# Patient Record
Sex: MALE | URBAN - METROPOLITAN AREA
[De-identification: names, ages, dates, MRNs, and addresses within clinical notes are randomized per-mention and may not be internally consistent; named-entity substitution may affect disease eponyms.]

---

## 2021-12-15 ENCOUNTER — HOSPITAL ENCOUNTER (OUTPATIENT)
Dept: LAB | Age: 76
Discharge: HOME | End: 2021-12-15

## 2021-12-15 PROCEDURE — 88305 TISSUE EXAM BY PATHOLOGIST: CPT

## 2022-07-19 ENCOUNTER — OFFICE VISIT (OUTPATIENT)
Dept: NEUROLOGY | Age: 77
End: 2022-07-19
Payer: MEDICARE

## 2022-07-19 VITALS
BODY MASS INDEX: 29.4 KG/M2 | DIASTOLIC BLOOD PRESSURE: 83 MMHG | HEART RATE: 88 BPM | WEIGHT: 194 LBS | SYSTOLIC BLOOD PRESSURE: 145 MMHG | HEIGHT: 68 IN

## 2022-07-19 DIAGNOSIS — I67.9 CEREBROVASCULAR SMALL VESSEL DISEASE: ICD-10-CM

## 2022-07-19 DIAGNOSIS — G62.9 PERIPHERAL POLYNEUROPATHY: Primary | ICD-10-CM

## 2022-07-19 DIAGNOSIS — R26.89 ABNORMALITY OF GAIT DUE TO IMPAIRMENT OF BALANCE: ICD-10-CM

## 2022-07-19 DIAGNOSIS — G25.0 ESSENTIAL TREMOR: ICD-10-CM

## 2022-07-19 PROCEDURE — 1123F ACP DISCUSS/DSCN MKR DOCD: CPT | Performed by: PSYCHIATRY & NEUROLOGY

## 2022-07-19 PROCEDURE — 99205 OFFICE O/P NEW HI 60 MIN: CPT | Performed by: PSYCHIATRY & NEUROLOGY

## 2022-07-19 RX ORDER — GABAPENTIN 300 MG/1
300 CAPSULE ORAL NIGHTLY
COMMUNITY
Start: 2022-03-30

## 2022-07-19 RX ORDER — DICYCLOMINE HYDROCHLORIDE 10 MG/1
CAPSULE ORAL
COMMUNITY
Start: 2022-06-25

## 2022-07-19 RX ORDER — LITHIUM CARBONATE 300 MG/1
300 CAPSULE ORAL DAILY
COMMUNITY
Start: 2022-04-20

## 2022-07-19 RX ORDER — FLUTICASONE PROPIONATE 50 MCG
2 SPRAY, SUSPENSION (ML) NASAL DAILY
COMMUNITY

## 2022-07-19 RX ORDER — ASPIRIN 81 MG/1
81 TABLET ORAL DAILY
COMMUNITY

## 2022-07-19 RX ORDER — ASPIRIN 81 MG
1 TABLET, DELAYED RELEASE (ENTERIC COATED) ORAL DAILY
COMMUNITY

## 2022-07-19 RX ORDER — OMEPRAZOLE 40 MG/1
40 CAPSULE, DELAYED RELEASE ORAL
COMMUNITY
Start: 2022-04-20

## 2022-07-19 RX ORDER — FAMOTIDINE 20 MG/1
TABLET, FILM COATED ORAL DAILY
COMMUNITY
Start: 2022-06-21

## 2022-07-19 RX ORDER — ROSUVASTATIN CALCIUM 40 MG/1
40 TABLET, COATED ORAL DAILY
COMMUNITY
Start: 2022-04-20

## 2022-07-19 RX ORDER — ASCORBIC ACID 500 MG
500 TABLET ORAL DAILY
COMMUNITY

## 2022-07-19 NOTE — PROGRESS NOTES
[place] [address], [address] [facility]  Phone: [phone] Fax [phone]  Chauncey Rico MD      Patient: Johana Calvo  Provider: Chauncey Rico MD    CC:   Chief Complaint   Patient presents with    New Patient     Discuss neuropathy      Referring Provider:    History of Present Illness:     Johana Calvo is a 69 y.o. RH male who presents for further evaluation of essential tremor and peripheral neuropathy. He is unaccompanied for today's visit. This gentleman presents for second opinion regarding his peripheral neuropathy which was diagnosed on nerve conduction studies by his previous neurologist in [facility]. It appears that serum lab work-up has already been done as well and has been unremarkable. He does have some numbness in his feet but denies any neuropathic pain. He was started on gabapentin 300 mg at night and this may help him sleep but otherwise no other obvious benefits. He denies any adverse effects. He also has a mild tremor of his hands which is more noticeable with posture and with action and clinically is most consistent with essential tremor. However it should be noted that he continues to take relatively low dose of lithium chronically for bipolar disorder. Serum lithium levels have been within normal limits. Overall his tremor remains relatively mild and is not causing significant disruption in day-to-day activities but can affect his handwriting and dexterity at times. He denies any tremor of the head or voice. There has been no clinical concern for parkinsonism and a previous DaTscan was normal.    He was prescribed primidone for the tremor but has not taken it due to being fearful of any adverse effects or interactions. He does have some mild issues with his balance but overall continues to walk unassisted and denies any recent falls. He does have stumbles occasionally and feels unsteady on his feet.     MRI of the brain has been notable for chronic lacunar infarct in the right putamen. Review of Systems:   Review of Systems   Constitutional:  Negative for fatigue. HENT:  Negative for congestion. Eyes:  Negative for visual disturbance. Respiratory:  Negative for cough. Cardiovascular:  Negative for chest pain. Gastrointestinal:  Negative for constipation. Genitourinary:  Negative for dysuria. Musculoskeletal:  Positive for gait problem. Skin:  Negative for rash. Allergic/Immunologic: Negative for immunocompromised state. Neurological:  Positive for tremors and numbness. Psychiatric/Behavioral:  Negative for confusion. Lab/Imaging Review:   I REVIEWED PERTINENT LABS, IMAGES, AND REPORTS WITH THE PATIENT PERSONALLY, DIRECTLY AND FULLY. THE MOST PERTINENT FINDINGS ARE NOTED BELOW:    MRI Brain February 2022:  Chronic lacunar infarct within the periventricular white matter adjacent to the right lateral ventricular body. There are associated microangiopathic changes within the periventricular and deep cortical white matter. Otherwise no other acute intracranial abnormality. DaTscan February 2022:  No evidence of striatal dopaminergic deficiency. Carotid ultrasounds February 2022:  Right internal carotid artery has less than 50% stenosis. Mild plaque is noted. Left internal carotid artery has less than 50% stenosis. Mild plaque is noted. Bilateral vertebral arteries have antegrade flow. Past Medical History:     Past medical history, surgical history, social history, family history, medications, and allergies were reviewed and updated as appropriate.      PAST MEDICAL HISTORY:  Past Medical History:   Diagnosis Date    Bipolar disorder ([facility] [address]     History of prostate cancer     Hyperlipidemia     Peripheral neuropathy     Tremor      PAST SURGICAL HISTORY:   Past Surgical History:   Procedure Laterality Date    APPENDECTOMY      CHOLECYSTECTOMY      PARATHYROIDECTOMY      TONSILLECTOMY FAMILY HISTORY:  Family History   Problem Relation Age of Onset    Dementia Mother     Depression Father       SOCIAL HISTORY:  Social History     Socioeconomic History    Marital status: Single     Spouse name: None    Number of children: None    Years of education: None    Highest education level: None   Tobacco Use    Smoking status: Every Day     Types: Cigarettes    Smokeless tobacco: Never       Medications/Allergies:     MEDICATIONS:   Outpatient Encounter Medications as of 7/19/2022   Medication Sig Dispense Refill    lithium 300 MG capsule Take 300 mg by mouth in the morning. omeprazole (PRILOSEC) 40 MG delayed release capsule Take 40 mg by mouth every morning (before breakfast)      fluticasone (FLONASE) 50 MCG/ACT nasal spray 2 sprays by Nasal route daily      dicyclomine (BENTYL) 10 MG capsule TAKE 1 CAPSULE BY MOUTH FOUR TIMES DAILY AS NEEDED      cyanocobalamin 1000 MCG tablet Take 2,000 mcg by mouth in the morning. Multiple Vitamins-Minerals (MULTIVITAMIN-MINERALS) TABS tablet Take 1 tablet by mouth in the morning. zinc 50 MG CAPS Take 1 tablet by mouth daily      ascorbic acid (VITAMIN C) 500 MG tablet Take 500 mg by mouth in the morning. rosuvastatin (CRESTOR) 40 MG tablet Take 40 mg by mouth in the morning. famotidine (PEPCID) 20 MG tablet daily      aspirin 81 MG EC tablet Take 81 mg by mouth in the morning. bimatoprost (LUMIGAN) 0.01 % SOLN ophthalmic drops 1 drop nightly      vitamin D (CHOLECALCIFEROL) 25 MCG (1000 UT) TABS tablet Take 1,000 Units by mouth every evening      gabapentin (NEURONTIN) 300 MG capsule Take 300 mg by mouth nightly. Ibuprofen-diphenhydrAMINE HCl 200-25 MG CAPS Take 1 capsule by mouth nightly       No facility-administered encounter medications on file as of 7/19/2022.      ALLERGIES:  Allergies   Allergen Reactions    Penicillins Nausea And Vomiting and Other (See Comments)       Physical Exam:     BP (!) 145/83   Pulse 88   Ht 5' 8\" (1.727 m)   Wt 194 lb (88 kg)   BMI 29.50 kg/m²     General Exam:  General: Well developed, well nourished, in no apparent distress. HEENT: Normocephalic, atraumatic. Sclera anicteric. Oropharynx clear. Neck: Supple without masses  Cardiovascular: Regular rate and rhythm. No carotid bruits. Lungs: Non-labored breathing. Abdomen: Soft, nontender, nondistended. Extremities: Peripheral pulses intact. No edema and no rashes. Neurological Exam:     MS/Language/Speech:  Alert. Oriented x 3. Language fluent. Speech normal.  No vocal tremor. Cranial Nerves: PERRL. Eye movements full with normal pursuits. No nystagmus. Face was symmetric with good activation and normal sensation. Tongue and palate were midline. Shoulder shrug symmetric. Motor: Strength was full in all proximal and distal muscle groups. Tone was normal.     Abnormal Movements: There is a mild postural and action tremor of the hands. No tremor at rest.  No head tremor. No bradykinesia. Sensory: Slight impairment to vibration in the distal lower extremities bilaterally. Cerebellar: No ataxia or dysmetria with finger-nose-finger bilaterally. Reflexes (R/L): Biceps (2+/2+), Brachioradialis (2+/2+), Patellar (2+/2+), Ankle (0+/0+). Wilson's was negative and plantar responses were flexor. Gait: He can rise from a seated position without difficulty. Posture is upright. Romberg is negative. He walks with a mildly unsteady gait and has difficulty with tandem walking. No parkinsonian features are noted. Assessment and Plan:     Renzo Abrams is a 69 y.o. male who presents with the following issues:     Rock Avila was seen today for new patient.     Diagnoses and all orders for this visit:    Peripheral polyneuropathy    Abnormality of gait due to impairment of balance  -     External Referral to Physical Therapy    Essential tremor    Cerebrovascular small vessel disease    Patient presents for second opinion regarding his peripheral neuropathy which has previously been confirmed on nerve conduction studies with his neurologist in [facility]. Serum lab work-up has been reviewed and has been unremarkable. We discussed the role of gabapentin with respect to symptom management in the context of neuropathic pain. He will likely continue gabapentin 300 mg at night. It does appear to help him sleep. His tremor of the hands is clinically most consistent with essential tremor and is found to be fairly mild. We discussed the role of medication such as primidone with respect to symptom management. I have discussed with him that I cannot rule out a contribution from his long-term use of lithium for his bipolar disorder but any dosage adjustments would need to be discussed with his prescriber. At this time he is elected to defer any treatment as he does not feel the tremor is causing any significant day-to-day disruptions. Should he change his mind we could consider trials of primidone or propranolol in the future. His MRI of the brain was reviewed and does show a chronic lacunar infarct. He was counseled on secondary stroke prevention measures. He will continue aspirin 81 mg daily and we discussed the importance of blood pressure and cholesterol management in addition to smoking cessation. He does have some balance difficulty at baseline that could benefit from physical therapy and he prefers to go to [facility] near his home in [place]. Follow up in 12 months. Signature: Magdaleno Leventhal, MD      Date:  7/21/2022    [facility]   [address] [place] [address]  Ph: [phone]  Fax: [phone]         I have personally interviewed and examined Mr. Chacorta Herrera and I have personally reviewed all relevant records including labs and imaging as noted above. I have written all aspects of this note.  More than 50% of this time was used for counseling regarding my diagnosis, prognosis, and plans for management. Total visit time: 64 minutes.

## 2022-07-21 ASSESSMENT — ENCOUNTER SYMPTOMS
CONSTIPATION: 0
COUGH: 0

## 2024-04-12 ENCOUNTER — OFFICE VISIT (OUTPATIENT)
Dept: NEUROLOGY | Age: 79
End: 2024-04-12

## 2024-04-12 VITALS
DIASTOLIC BLOOD PRESSURE: 84 MMHG | BODY MASS INDEX: 29.61 KG/M2 | SYSTOLIC BLOOD PRESSURE: 138 MMHG | HEIGHT: 68 IN | OXYGEN SATURATION: 97 % | HEART RATE: 64 BPM | WEIGHT: 195.4 LBS

## 2024-04-12 DIAGNOSIS — I67.9 CEREBROVASCULAR SMALL VESSEL DISEASE: ICD-10-CM

## 2024-04-12 DIAGNOSIS — G62.9 PERIPHERAL POLYNEUROPATHY: Primary | ICD-10-CM

## 2024-04-12 DIAGNOSIS — M79.2 NEUROPATHIC PAIN: ICD-10-CM

## 2024-04-12 DIAGNOSIS — G25.0 ESSENTIAL TREMOR: ICD-10-CM

## 2024-04-12 RX ORDER — GABAPENTIN 300 MG/1
300 CAPSULE ORAL NIGHTLY
Qty: 90 CAPSULE | Refills: 3 | Status: SHIPPED | OUTPATIENT
Start: 2024-04-12 | End: 2025-04-07

## 2024-04-12 ASSESSMENT — PATIENT HEALTH QUESTIONNAIRE - PHQ9
2. FEELING DOWN, DEPRESSED OR HOPELESS: NOT AT ALL
SUM OF ALL RESPONSES TO PHQ QUESTIONS 1-9: 0
SUM OF ALL RESPONSES TO PHQ9 QUESTIONS 1 & 2: 0
SUM OF ALL RESPONSES TO PHQ QUESTIONS 1-9: 0
SUM OF ALL RESPONSES TO PHQ QUESTIONS 1-9: 0
1. LITTLE INTEREST OR PLEASURE IN DOING THINGS: NOT AT ALL
SUM OF ALL RESPONSES TO PHQ QUESTIONS 1-9: 0

## 2024-04-12 ASSESSMENT — ENCOUNTER SYMPTOMS
CONSTIPATION: 0
COUGH: 0

## 2024-04-12 NOTE — PROGRESS NOTES
[facility] NEUROLOGY  [address], [address]  [place] [address]  Phone: [phone] Fax [phone]  Jus Sapp MD      Patient: Jag Neves  Provider: Jus Sapp MD    CC:   Chief Complaint   Patient presents with    Follow-up    Neurologic Problem     Referring Provider:    History of Present Illness:     Jag Neves is a 78 y.o. RH male who presents for follow up of essential tremor and peripheral neuropathy.    He is unaccompanied for today's visit. He was last seen April 2023.     No significant changes since the last visit.  He has a peripheral neuropathy, previously diagnosed on nerve conduction studies by his previous neurologist in [place].  Previous serum lab work-up has already been done as well and has been unremarkable.      He denies any neuropathic pain and continues to take gabapentin 300 mg at night which helps him sleep.  He denies adverse effects.    There has been a mild tremor of the hands which has been best described as a slight postural and action tremor, more consistent with essential tremor, though we have not been able to rule out contributions from chronic lithium use.  Serum lithium levels have been within normal limits.  The tremor remains very mild and he actually reports it is somewhat improved since his last visit.  It is not causing any significant functional limitations.  There has been no concern for parkinsonism and previous DaTscan was normal.    He does have some mild issues with his balance but appears to be doing better after physical therapy. Continues to walk unassisted.  Denies any recent falls.    MRI of the brain has been notable for chronic lacunar infarct in the right putamen.       Review of Systems:   Review of Systems   Constitutional:  Negative for fatigue.   HENT:  Negative for congestion.    Eyes:  Negative for visual disturbance.   Respiratory:  Negative for cough.    Cardiovascular:  Negative for chest pain.   Gastrointestinal:  Negative for

## 2025-04-17 ASSESSMENT — ENCOUNTER SYMPTOMS
CONSTIPATION: 0
COUGH: 0

## 2025-04-17 NOTE — PROGRESS NOTES
[facility] NEUROLOGY  [address], [address]  [place] [address]  Phone: [phone] Fax [phone]  Jus Sapp MD      Patient: Jag Neves  Provider: Jus Sapp MD    CC:   Chief Complaint   Patient presents with    Follow-up     Neuropathy     Referring Provider:    History of Present Illness:     Jag Neves is a 79 y.o. RH male who presents for follow up of essential tremor and peripheral neuropathy.    He is unaccompanied for today's visit. He was last seen April 2024.     Patient presents for follow-up of the mild tremor of the hands which clinically has been most consistent with that of a benign essential tremor, though we have not ruled out contributions from his chronic use of lithium for bipolar disorder.  He also has a history of peripheral neuropathy.    Current medications include (but not limited to):  Gabapentin 300 mg at night  Lithium 300 mg daily    Previous medication trials include: N/A    Patient presents today for follow-up.    Chart review reveals no significant changes since last visit.    He has a peripheral neuropathy, previously diagnosed on electrodiagnostic studies done by his previous neurologist in [place].  Previous serum lab work-up for other acquired causes has already been done and unremarkable.      He denies any neuropathic pain and continues to take gabapentin 300 mg at night which helps him sleep.  He denies adverse effects.    Tremor of the hands remains relatively mild, best described as a postural and action tremor.  It does not appear to be causing any significant functional limitations.    There has been no concern for parkinsonism and previous DaTscan was normal.    He does have some mild issues with his balance but appears to be doing better after physical therapy. Continues to walk unassisted.  Denies any recent falls.    MRI of the brain has been notable for chronic lacunar infarct in the right putamen.       Review of Systems:   Review of Systems

## 2025-04-18 ENCOUNTER — OFFICE VISIT (OUTPATIENT)
Dept: NEUROLOGY | Age: 80
End: 2025-04-18
Payer: MEDICARE

## 2025-04-18 VITALS
HEIGHT: 68 IN | SYSTOLIC BLOOD PRESSURE: 145 MMHG | DIASTOLIC BLOOD PRESSURE: 88 MMHG | HEART RATE: 79 BPM | BODY MASS INDEX: 29.71 KG/M2

## 2025-04-18 DIAGNOSIS — G25.0 ESSENTIAL TREMOR: ICD-10-CM

## 2025-04-18 DIAGNOSIS — G62.9 PERIPHERAL POLYNEUROPATHY: Primary | ICD-10-CM

## 2025-04-18 DIAGNOSIS — I67.9 CEREBROVASCULAR SMALL VESSEL DISEASE: ICD-10-CM

## 2025-04-18 DIAGNOSIS — M79.2 NEUROPATHIC PAIN: ICD-10-CM

## 2025-04-18 PROCEDURE — G8419 CALC BMI OUT NRM PARAM NOF/U: HCPCS | Performed by: PSYCHIATRY & NEUROLOGY

## 2025-04-18 PROCEDURE — 1036F TOBACCO NON-USER: CPT | Performed by: PSYCHIATRY & NEUROLOGY

## 2025-04-18 PROCEDURE — 1123F ACP DISCUSS/DSCN MKR DOCD: CPT | Performed by: PSYCHIATRY & NEUROLOGY

## 2025-04-18 PROCEDURE — 1159F MED LIST DOCD IN RCRD: CPT | Performed by: PSYCHIATRY & NEUROLOGY

## 2025-04-18 PROCEDURE — 99213 OFFICE O/P EST LOW 20 MIN: CPT | Performed by: PSYCHIATRY & NEUROLOGY

## 2025-04-18 PROCEDURE — G8427 DOCREV CUR MEDS BY ELIG CLIN: HCPCS | Performed by: PSYCHIATRY & NEUROLOGY

## 2025-04-18 RX ORDER — GABAPENTIN 300 MG/1
300 CAPSULE ORAL NIGHTLY
Qty: 90 CAPSULE | Refills: 3 | Status: SHIPPED | OUTPATIENT
Start: 2025-04-18 | End: 2026-04-13